# Patient Record
Sex: FEMALE | Race: WHITE | HISPANIC OR LATINO
[De-identification: names, ages, dates, MRNs, and addresses within clinical notes are randomized per-mention and may not be internally consistent; named-entity substitution may affect disease eponyms.]

---

## 2020-04-06 ENCOUNTER — TRANSCRIPTION ENCOUNTER (OUTPATIENT)
Age: 40
End: 2020-04-06

## 2020-04-22 ENCOUNTER — TRANSCRIPTION ENCOUNTER (OUTPATIENT)
Age: 40
End: 2020-04-22

## 2021-03-25 ENCOUNTER — TRANSCRIPTION ENCOUNTER (OUTPATIENT)
Age: 41
End: 2021-03-25

## 2021-04-06 ENCOUNTER — APPOINTMENT (OUTPATIENT)
Dept: DISASTER EMERGENCY | Facility: OTHER | Age: 41
End: 2021-04-06
Payer: COMMERCIAL

## 2021-04-06 PROCEDURE — 0001A: CPT

## 2021-04-27 ENCOUNTER — APPOINTMENT (OUTPATIENT)
Dept: DISASTER EMERGENCY | Facility: OTHER | Age: 41
End: 2021-04-27
Payer: COMMERCIAL

## 2021-04-27 PROCEDURE — 0002A: CPT

## 2021-06-24 PROBLEM — Z00.00 ENCOUNTER FOR PREVENTIVE HEALTH EXAMINATION: Status: ACTIVE | Noted: 2021-06-24

## 2021-06-30 ENCOUNTER — APPOINTMENT (OUTPATIENT)
Dept: ORTHOPEDIC SURGERY | Facility: CLINIC | Age: 41
End: 2021-06-30
Payer: COMMERCIAL

## 2021-06-30 VITALS — HEIGHT: 59 IN | DIASTOLIC BLOOD PRESSURE: 117 MMHG | HEART RATE: 112 BPM | SYSTOLIC BLOOD PRESSURE: 166 MMHG

## 2021-06-30 DIAGNOSIS — Z78.9 OTHER SPECIFIED HEALTH STATUS: ICD-10-CM

## 2021-06-30 DIAGNOSIS — Z84.89 FAMILY HISTORY OF OTHER SPECIFIED CONDITIONS: ICD-10-CM

## 2021-06-30 DIAGNOSIS — Z80.9 FAMILY HISTORY OF MALIGNANT NEOPLASM, UNSPECIFIED: ICD-10-CM

## 2021-06-30 DIAGNOSIS — M25.561 PAIN IN RIGHT KNEE: ICD-10-CM

## 2021-06-30 DIAGNOSIS — M25.562 PAIN IN RIGHT KNEE: ICD-10-CM

## 2021-06-30 DIAGNOSIS — F17.200 NICOTINE DEPENDENCE, UNSPECIFIED, UNCOMPLICATED: ICD-10-CM

## 2021-06-30 DIAGNOSIS — Z82.62 FAMILY HISTORY OF OSTEOPOROSIS: ICD-10-CM

## 2021-06-30 PROCEDURE — 99203 OFFICE O/P NEW LOW 30 MIN: CPT

## 2021-06-30 PROCEDURE — 99072 ADDL SUPL MATRL&STAF TM PHE: CPT

## 2021-06-30 NOTE — PHYSICAL EXAM
[de-identified] : General Exam\par \par Well developed, well nourished\par No apparent distress\par Oriented to person, place, and time\par Mood: Normal\par Affect: Normal\par Balance and coordination: Normal\par Gait: Normal\par \par right knee exam\par \par Skin: Clean, dry, intact\par Inspection: No obvious malalignment, no masses, no swelling, no effusion.\par Tenderness: no MJLT. No LJLT. No tenderness over the medial and lateral patella facets. No ttp medial/lateral epicondyle, patella tendon, tibial tubercle, pes anserinus, or proximal fibula.\par ROM: 0 to 130° no pain with deep flexion in both knees\par Stability: Stable to varus, valgus, lachman testing. Negative anterior/posterior drawer.\par Additional tests: Negative McMurrays test, Negative patellar grind test. \par Strength: 5-/5 Q/H/TA/GS/EHL, no atrophy\par Neuro: In tact to light touch throughout in dp/sp/tib/araceli/saph nerve districutions, DTR's normal\par Pulses: 2+ DP/PT pulses.\par \par rleft knee exam\par \par Skin: Clean, dry, intact\par Inspection: No obvious malalignment, no masses, no swelling, no effusion.\par Tenderness: no MJLT. No LJLT. No tenderness over the medial and lateral patella facets. No ttp medial/lateral epicondyle, patella tendon, tibial tubercle, pes anserinus, or proximal fibula.\par ROM: 0 to 130° no pain with deep flexion in both knees\par Stability: Stable to varus, valgus, lachman testing. Negative anterior/posterior drawer.\par Additional tests: Negative McMurrays test, Negative patellar grind test. \par Strength: 5-/5 Q/H/TA/GS/EHL, no atrophy\par Neuro: In tact to light touch throughout in dp/sp/tib/araceli/saph nerve districutions, DTR's normal\par Pulses: 2+ DP/PT pulses.\par  [de-identified] : mRI reports and images of both knees were reviewed. One is unremarkable. The other knee there is a small nondisplaced medial meniscus tear and a small ganglion cyst

## 2021-06-30 NOTE — HISTORY OF PRESENT ILLNESS
[de-identified] : 40-year-old female with a chief complaint of bilateral leg pain numbness and weakness. She reports her symptoms started having gallbladder surgery. She is in multiple specialists including neurology rheumatology and orthopedics.She had multiple studies done by multiple providers. She presents today with several MRIs of her knee is in no records of any rather doctor visits.\par \par The patient's past medical history, past surgical history, medications, allergies, and social history were reviewed by me today with the patient and documented accordingly. In addition, the patient's family history, which is noncontributory to this visit, was also reviewed.\par

## 2021-06-30 NOTE — DISCUSSION/SUMMARY
[de-identified] : 40-year-old female complains of bilateral leg pain numbness tingling and weakness. She significant quadriceps and core weakness consistent with patellofemoral pain I recommended physical therapy to address this. We discussed at length that the small ganglion cyst and meniscal tear are unlikely to explain her symptoms in both legs with pain and weakness radiating up and down both of her legs. She will provide us with the records of her neurology rheumatology consultations. I do not feel this is orthopedic in nature at least in terms of her knees are unlikely the culprit. Recommend physical therapy. She may follow up as needed

## 2021-08-26 ENCOUNTER — TRANSCRIPTION ENCOUNTER (OUTPATIENT)
Age: 41
End: 2021-08-26

## 2021-11-03 ENCOUNTER — APPOINTMENT (OUTPATIENT)
Dept: ENDOCRINOLOGY | Facility: CLINIC | Age: 41
End: 2021-11-03

## 2022-01-31 ENCOUNTER — APPOINTMENT (OUTPATIENT)
Dept: SURGERY | Facility: CLINIC | Age: 42
End: 2022-01-31
Payer: COMMERCIAL

## 2022-01-31 VITALS
TEMPERATURE: 97.6 F | DIASTOLIC BLOOD PRESSURE: 91 MMHG | WEIGHT: 130 LBS | SYSTOLIC BLOOD PRESSURE: 135 MMHG | RESPIRATION RATE: 17 BRPM | BODY MASS INDEX: 26.21 KG/M2 | HEIGHT: 59 IN | HEART RATE: 95 BPM | OXYGEN SATURATION: 98 %

## 2022-01-31 DIAGNOSIS — Z87.19 PERSONAL HISTORY OF OTHER DISEASES OF THE DIGESTIVE SYSTEM: ICD-10-CM

## 2022-01-31 DIAGNOSIS — Z86.79 PERSONAL HISTORY OF OTHER DISEASES OF THE CIRCULATORY SYSTEM: ICD-10-CM

## 2022-01-31 DIAGNOSIS — K64.8 PERIANAL VENOUS THROMBOSIS: ICD-10-CM

## 2022-01-31 DIAGNOSIS — K64.5 PERIANAL VENOUS THROMBOSIS: ICD-10-CM

## 2022-01-31 DIAGNOSIS — Z83.79 FAMILY HISTORY OF OTHER DISEASES OF THE DIGESTIVE SYSTEM: ICD-10-CM

## 2022-01-31 PROCEDURE — 99244 OFF/OP CNSLTJ NEW/EST MOD 40: CPT | Mod: 25

## 2022-01-31 PROCEDURE — 46600 DIAGNOSTIC ANOSCOPY SPX: CPT

## 2022-01-31 RX ORDER — POLYETHYLENE GLYCOL-3350 AND ELECTROLYTES 236; 6.74; 5.86; 2.97; 22.74 G/274.31G; G/274.31G; G/274.31G; G/274.31G; G/274.31G
236 POWDER, FOR SOLUTION ORAL
Qty: 4000 | Refills: 0 | Status: DISCONTINUED | COMMUNITY
Start: 2022-01-25 | End: 2022-01-31

## 2022-01-31 RX ORDER — MELOXICAM 15 MG/1
15 TABLET ORAL
Qty: 30 | Refills: 0 | Status: ACTIVE | COMMUNITY
Start: 2022-01-17

## 2022-01-31 RX ORDER — HYDROCORTISONE 25 MG/G
2.5 CREAM TOPICAL
Qty: 30 | Refills: 0 | Status: ACTIVE | COMMUNITY
Start: 2022-01-24

## 2022-01-31 RX ORDER — FAMOTIDINE 20 MG/1
20 TABLET, FILM COATED ORAL
Qty: 14 | Refills: 0 | Status: ACTIVE | COMMUNITY
Start: 2021-11-19

## 2022-01-31 RX ORDER — LACTULOSE 10 G/15ML
10 SOLUTION ORAL
Qty: 473 | Refills: 0 | Status: DISCONTINUED | COMMUNITY
Start: 2022-01-25

## 2022-01-31 RX ORDER — ACETAMINOPHEN EXTRA STRENGTH 500 MG/1
500 TABLET ORAL
Qty: 90 | Refills: 0 | Status: ACTIVE | COMMUNITY
Start: 2021-11-29

## 2022-01-31 RX ORDER — LOSARTAN POTASSIUM 25 MG/1
25 TABLET, FILM COATED ORAL
Qty: 90 | Refills: 0 | Status: ACTIVE | COMMUNITY
Start: 2022-01-07

## 2022-01-31 NOTE — PHYSICAL EXAM
[FreeTextEntry1] : This is a 41 year-old well-developed female in no apparent distress.\par \par HEENT normocephalic, anicteric, external ears normal bilaterally, EOMs intact.\par \par Cardiac - regular rate and rhythm.\par \par Abdomen soft, nontender, nondistended, no masses. No hepatosplenomegaly.\par \par Examination of the perineum reveals right anterolateral and right posterior lateral external hemorrhoids and a left lateral external hemorrhoid with a prolapsing thrombosed internal component. \par Digital rectal examination reveals normal sphincter tone. \par Anoscopy reveals moderately enlarged internal hemorrhoids with thrombosed vessels along the left lateral position.\par \par Neuro-cranial nerves grossly intact. Normal gait.\par \par Psychiatric-oriented to time place and person. Good understanding of conversation.

## 2022-01-31 NOTE — HISTORY OF PRESENT ILLNESS
[FreeTextEntry1] : Shakila is a 40 y/o female here for anorectal pain.  The patient reports longstanding excess perianal tissue after childbirth 21 years ago.  Reports longstanding history of constipation and that after her laparoscopic cholecystectomy in the summer 2020 things took a turn for the worse in terms of her GI function.  She had an ER visit at [facility] the second week of January for what appears to be deep pelvic pain and the ED told her it was due to fibroids.  She saw her GYN doctor GYN then referred her back to the GI doctor.  Reports she tried lactulose.  Currently she is on Linzess and Trulance.  States that these medications help some.  She reports straining and constipation.  States that after she had Covid in December then in January she started experiencing anorectal pain.  In addition to the baseline excess perianal tissue she reports intermittent perianal swelling several times during the month. Starting 1/12/22, she had episodes of BRBPR with BMs. Last saw blood a week ago. \par The patient reports feeling frustrated because no one can give her pain relief.

## 2022-01-31 NOTE — ASSESSMENT
[FreeTextEntry1] : 41-year-old female with external and internal thrombosed and prolapsing hemorrhoids in the background of constipation and straining.  I discussed that the thrombosis will resolve on its own.  In order to treat her hemorrhoid disease she would need an excisional hemorrhoidectomy which unfortunately has a painful recovery.  Her hemorrhoids would likely return after hemorrhoidectomy due to her constipation and straining. All above make her a very poor surgical candidate and I would not recommend a hemorrhoidectomy for her.  The patient was asking whether I can simply tie the vein that feeds the hemorrhoid.  I explained my recommendations as well as I could and answered all her questions.\par RTO as needed.

## 2022-07-19 ENCOUNTER — NON-APPOINTMENT (OUTPATIENT)
Age: 42
End: 2022-07-19

## 2022-07-19 ENCOUNTER — APPOINTMENT (OUTPATIENT)
Dept: GASTROENTEROLOGY | Facility: CLINIC | Age: 42
End: 2022-07-19

## 2022-07-19 DIAGNOSIS — K59.09 OTHER CONSTIPATION: ICD-10-CM

## 2022-07-19 DIAGNOSIS — Z01.812 ENCOUNTER FOR PREPROCEDURAL LABORATORY EXAMINATION: ICD-10-CM

## 2022-07-19 PROCEDURE — 99441: CPT

## 2022-08-30 LAB — SARS-COV-2 N GENE NPH QL NAA+PROBE: NOT DETECTED

## 2022-09-02 ENCOUNTER — TRANSCRIPTION ENCOUNTER (OUTPATIENT)
Age: 42
End: 2022-09-02

## 2022-09-02 ENCOUNTER — OUTPATIENT (OUTPATIENT)
Dept: OUTPATIENT SERVICES | Facility: HOSPITAL | Age: 42
LOS: 1 days | End: 2022-09-02
Payer: COMMERCIAL

## 2022-09-02 ENCOUNTER — APPOINTMENT (OUTPATIENT)
Dept: GASTROENTEROLOGY | Facility: HOSPITAL | Age: 42
End: 2022-09-02

## 2022-09-02 VITALS
RESPIRATION RATE: 16 BRPM | OXYGEN SATURATION: 99 % | HEIGHT: 59 IN | SYSTOLIC BLOOD PRESSURE: 138 MMHG | DIASTOLIC BLOOD PRESSURE: 87 MMHG | TEMPERATURE: 98 F | HEART RATE: 73 BPM | WEIGHT: 119.93 LBS

## 2022-09-02 DIAGNOSIS — K59.09 OTHER CONSTIPATION: ICD-10-CM

## 2022-09-02 DIAGNOSIS — Z98.51 TUBAL LIGATION STATUS: Chronic | ICD-10-CM

## 2022-09-02 DIAGNOSIS — Z98.891 HISTORY OF UTERINE SCAR FROM PREVIOUS SURGERY: Chronic | ICD-10-CM

## 2022-09-02 DIAGNOSIS — Z98.890 OTHER SPECIFIED POSTPROCEDURAL STATES: Chronic | ICD-10-CM

## 2022-09-02 DIAGNOSIS — Z90.49 ACQUIRED ABSENCE OF OTHER SPECIFIED PARTS OF DIGESTIVE TRACT: Chronic | ICD-10-CM

## 2022-09-02 PROCEDURE — 91122 ANORECTAL MANOMETRY: CPT | Mod: 26

## 2022-09-02 PROCEDURE — 91120: CPT | Mod: 26

## 2022-09-02 PROCEDURE — 91122: CPT

## 2022-09-02 DEVICE — CATH THERMODIL PACE 7.5FR: Type: IMPLANTABLE DEVICE | Status: FUNCTIONAL

## 2022-09-02 DEVICE — KIT A-LINE 1LUM 20G X 12CM SAFE KIT: Type: IMPLANTABLE DEVICE | Status: FUNCTIONAL

## 2022-09-02 DEVICE — KIT CVC 2LUM MAC 9FR CHG: Type: IMPLANTABLE DEVICE | Status: FUNCTIONAL

## 2022-09-02 DEVICE — NET RETRV ROT ROTH 2.5MMX230CM: Type: IMPLANTABLE DEVICE | Status: FUNCTIONAL

## 2022-09-02 RX ORDER — LUBIPROSTONE 24 UG/1
1 CAPSULE, GELATIN COATED ORAL
Qty: 0 | Refills: 0 | DISCHARGE

## 2022-09-02 RX ORDER — LACTULOSE 10 G/15ML
15 SOLUTION ORAL
Qty: 0 | Refills: 0 | DISCHARGE

## 2022-09-02 NOTE — ASU PATIENT PROFILE, ADULT - NSICDXPASTSURGICALHX_GEN_ALL_CORE_FT
PAST SURGICAL HISTORY:  H/O hemorrhoidectomy     H/O tubal ligation     S/P      S/P cholecystectomy

## 2022-09-02 NOTE — PRE PROCEDURE NOTE - PRE PROCEDURE EVALUATION
Attending Physician:              Jagdish Jarrett MD MSEd  Performing Provider:            LEOLA Bunch_ C   Indication for Procedure;       Chronic constipation                PAST MEDICAL & SURGICAL HISTORY:  No pertinent past medical history      H/O tubal ligation      S/P cholecystectomy      S/P       H/O hemorrhoidectomy            See Allscripts Note for further details  ALLERGIES:  No Known Allergies    HOME MEDICATIONS:  lactulose 10 g/15 mL oral syrup: 15 milliliter(s) orally once a day  lubiprostone 8 mcg oral capsule: 1 cap(s) orally 2 times a day      See Allscripts Note for further details    AICD/PPM: [ ] yes   [ x] no    PERTINENT LAB DATA:                      PHYSICAL EXAMINATION:    Height (cm): 149.9  Weight (kg): 54.4  BMI (kg/m2): 24.2  BSA (m2): 1.48T(C): 36.4  HR: 73  BP: 138/87  RR: 16  SpO2: 99%    Constitutional: NAD  HEENT: PERRLA, EOMI,    Neck:  No JVD  Respiratory: CTAB/L  Cardiovascular: S1 and S2  Gastrointestinal: BS+, soft, NT/ND  Extremities: No peripheral edema  Neurological: A/O x 3, no focal deficits  Psychiatric: Normal mood, normal affect  Skin: No rashes    ASA Class: I [ ]  II [ x]  III [ ]  IV [ ]    COMMENTS:    The patient is a suitable candidate for the planned procedure unless box checked [ ]  No, explain:

## 2022-09-02 NOTE — ASU PATIENT PROFILE, ADULT - FALL HARM RISK - UNIVERSAL INTERVENTIONS
Bed in lowest position, wheels locked, appropriate side rails in place/Call bell, personal items and telephone in reach/Instruct patient to call for assistance before getting out of bed or chair/Non-slip footwear when patient is out of bed/[place] to call system/Physically safe environment - no spills, clutter or unnecessary equipment/Purposeful Proactive Rounding/Room/bathroom lighting operational, light cord in reach

## 2022-09-02 NOTE — ASU DISCHARGE PLAN (ADULT/PEDIATRIC) - DATE OF LAST VACCINATION
Ventricular Rate : 53  Atrial Rate : 53  P-R Interval : 202  QRS Duration : 88  Q-T Interval : 454  QTC Calculation(Bezet) : 426  P Axis : 34  R Axis : -2  T Axis : 37  Diagnosis : Sinus bradycardia  Minimal voltage criteria for LVH, may be normal variant  Possible Inferior infarct , age undetermined  Cannot rule out Anterior infarct (cited on or before 06-MAY-2019)  Abnormal ECG  When compared with ECG of 06-MAY-2019 18:06,  Questionable change in initial forces of Anterior leads  Confirmed by Lowell Webber ([id number]) on 5/17/2019 6:08:21 PM   27-Apr-2021

## 2022-09-23 ENCOUNTER — TRANSCRIPTION ENCOUNTER (OUTPATIENT)
Age: 42
End: 2022-09-23

## 2022-09-26 ENCOUNTER — TRANSCRIPTION ENCOUNTER (OUTPATIENT)
Age: 42
End: 2022-09-26

## 2022-10-05 ENCOUNTER — TRANSCRIPTION ENCOUNTER (OUTPATIENT)
Age: 42
End: 2022-10-05

## 2022-10-06 PROBLEM — Z78.9 OTHER SPECIFIED HEALTH STATUS: Chronic | Status: ACTIVE | Noted: 2022-09-02

## 2022-10-07 ENCOUNTER — TRANSCRIPTION ENCOUNTER (OUTPATIENT)
Age: 42
End: 2022-10-07

## 2022-11-02 ENCOUNTER — APPOINTMENT (OUTPATIENT)
Dept: SURGERY | Facility: CLINIC | Age: 42
End: 2022-11-02

## 2022-11-02 VITALS
RESPIRATION RATE: 18 BRPM | OXYGEN SATURATION: 99 % | TEMPERATURE: 97.8 F | HEART RATE: 86 BPM | SYSTOLIC BLOOD PRESSURE: 134 MMHG | WEIGHT: 120 LBS | BODY MASS INDEX: 24.19 KG/M2 | HEIGHT: 59 IN | DIASTOLIC BLOOD PRESSURE: 90 MMHG

## 2022-11-02 DIAGNOSIS — M62.89 OTHER SPECIFIED DISORDERS OF MUSCLE: ICD-10-CM

## 2022-11-02 DIAGNOSIS — K59.4 ANAL SPASM: ICD-10-CM

## 2022-11-02 PROCEDURE — 99213 OFFICE O/P EST LOW 20 MIN: CPT | Mod: 25

## 2022-11-02 PROCEDURE — 46600 DIAGNOSTIC ANOSCOPY SPX: CPT

## 2022-11-02 RX ORDER — LUBIPROSTONE 24 UG/1
24 CAPSULE ORAL
Qty: 60 | Refills: 0 | Status: ACTIVE | COMMUNITY
Start: 2022-10-11

## 2022-11-02 RX ORDER — ONDANSETRON 4 MG/1
4 TABLET ORAL
Qty: 15 | Refills: 0 | Status: DISCONTINUED | COMMUNITY
Start: 2021-11-19 | End: 2022-11-02

## 2022-11-02 RX ORDER — HYDROCHLOROTHIAZIDE 12.5 MG/1
12.5 CAPSULE ORAL
Qty: 90 | Refills: 0 | Status: DISCONTINUED | COMMUNITY
Start: 2021-11-19 | End: 2022-11-02

## 2022-11-02 RX ORDER — LEVONORGESTREL AND ETHINYL ESTRADIOL 100-20(84)
0.1-0.02 & 0.01 KIT ORAL
Qty: 91 | Refills: 0 | Status: DISCONTINUED | COMMUNITY
Start: 2022-01-14 | End: 2022-11-02

## 2022-11-02 RX ORDER — PROPRANOLOL HYDROCHLORIDE 10 MG/1
10 TABLET ORAL
Qty: 180 | Refills: 0 | Status: DISCONTINUED | COMMUNITY
Start: 2021-12-03 | End: 2022-11-02

## 2022-11-02 RX ORDER — DICYCLOMINE HYDROCHLORIDE 20 MG/1
20 TABLET ORAL
Qty: 60 | Refills: 0 | Status: DISCONTINUED | COMMUNITY
Start: 2022-01-17 | End: 2022-11-02

## 2022-11-02 NOTE — ASSESSMENT
[FreeTextEntry1] : I have seen and evaluated patient and I have corroborated all nursing input into this note.  Patient was able to easily evacuate stool up until she had her first child.  She was able to sit on the toilet and evacuate without significant Valsalva.  Her difficulty with sphincter spasm began at age 19.  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  Therefore, there is no indication for circular muscle/internal sphincter biopsy to rule out Hirschsprung's.  Patient should proceed with pelvic floor physical therapy.  If the therapist is of the opinion that internal sphincter Botox injections will augment the treatment then the patient will contact my office to make arrangements for the procedure.
Statement Selected

## 2022-11-02 NOTE — PHYSICAL EXAM
[Normal Breath Sounds] : Normal breath sounds [Normal Heart Sounds] : normal heart sounds [No Rash or Lesion] : No rash or lesion [Alert] : alert [Oriented to Person] : oriented to person [Oriented to Place] : oriented to place [Oriented to Time] : oriented to time [Calm] : calm [JVD] : no jugular venous distention  [de-identified] : Well developed female [de-identified] : Normal [de-identified] : Normal [de-identified] : Normal [FreeTextEntry1] : Perianal inspection demonstrated external tags and scarring in the right posterior position consistent with a prior hemorrhoidectomy.  Internal sphincter spasm was noted on digital exam.  Anoscopy mild internal hemorrhoid enlargement.\par \par \par Anoscopy performed to evaluate internal hemorrhoids.

## 2022-11-02 NOTE — HISTORY OF PRESENT ILLNESS
[FreeTextEntry1] : Yanira is a 42 years old female here for a consultation. \par \par Colonoscopy 2/9/22 - Prolapsed external hemorrhoids otherwise normal colonoscopy.  Status post biopsies taken to rule out microscopic colitis.  \par \par Anorectal motility study 9/2/22-rectoanal inhibitory reflex present.  Blunted relaxation noted.\par \par Patient had hemorrhoidectomy earlier this year.  She moves her bowels without difficulty up until her first child.  Prior to her first child she would sit on the toilet and evacuate without having to Valsalva significantly.  More recently her stool has become harder and she has had increasing difficulty with bowel movements.\par \par Today patient reports feeling well. Patient reports chronic constipation up to after her son was born, got worse after cholecystectomy in  2020. Reports thin small BM in varying frequency ( 1-5 days) BRB bleeding with constipated BM on toilet paper and toilet bowel. Reports pain with BM. Reports anal itching and prolapsing tissues shrinks and swollen. Appetite is poor, no nausea/vomiting. Taking Lubiprostone  for IBS and constipation. Patient is not on any anticoagulants.

## 2022-11-02 NOTE — CONSULT LETTER
[Dear  ___] : Dear ~GARRETT, [Courtesy Letter:] : I had the pleasure of seeing your patient, [unfilled], in my office today. [Please see my note below.] : Please see my note below. [Consult Closing:] : Thank you very much for allowing me to participate in the care of this patient.  If you have any questions, please do not hesitate to contact me. [Sincerely,] : Sincerely, [FreeTextEntry2] : Dr. Christopher Gavin [FreeTextEntry3] : Wily Dominguez M.D., SHIVAM.BINH., F.THOMAS.S.NINAREditaS.\[facility] Chief Colorectal Clinical Services, [facility]

## 2022-11-07 ENCOUNTER — NON-APPOINTMENT (OUTPATIENT)
Age: 42
End: 2022-11-07

## 2023-05-21 ENCOUNTER — EMERGENCY (EMERGENCY)
Facility: HOSPITAL | Age: 43
LOS: 1 days | Discharge: HOME | End: 2023-05-21
Attending: STUDENT IN AN ORGANIZED HEALTH CARE EDUCATION/TRAINING PROGRAM | Admitting: STUDENT IN AN ORGANIZED HEALTH CARE EDUCATION/TRAINING PROGRAM
Payer: COMMERCIAL

## 2023-05-21 VITALS
RESPIRATION RATE: 16 BRPM | SYSTOLIC BLOOD PRESSURE: 116 MMHG | DIASTOLIC BLOOD PRESSURE: 81 MMHG | TEMPERATURE: 98 F | HEART RATE: 88 BPM | OXYGEN SATURATION: 100 %

## 2023-05-21 VITALS
TEMPERATURE: 98 F | DIASTOLIC BLOOD PRESSURE: 86 MMHG | RESPIRATION RATE: 17 BRPM | HEART RATE: 65 BPM | OXYGEN SATURATION: 100 % | SYSTOLIC BLOOD PRESSURE: 118 MMHG

## 2023-05-21 DIAGNOSIS — Z90.49 ACQUIRED ABSENCE OF OTHER SPECIFIED PARTS OF DIGESTIVE TRACT: Chronic | ICD-10-CM

## 2023-05-21 DIAGNOSIS — Z98.890 OTHER SPECIFIED POSTPROCEDURAL STATES: Chronic | ICD-10-CM

## 2023-05-21 DIAGNOSIS — Z98.51 TUBAL LIGATION STATUS: Chronic | ICD-10-CM

## 2023-05-21 DIAGNOSIS — Z98.891 HISTORY OF UTERINE SCAR FROM PREVIOUS SURGERY: Chronic | ICD-10-CM

## 2023-05-21 LAB
ALBUMIN SERPL ELPH-MCNC: 4.2 G/DL — SIGNIFICANT CHANGE UP (ref 3.3–5)
ALP SERPL-CCNC: 72 U/L — SIGNIFICANT CHANGE UP (ref 40–120)
ALT FLD-CCNC: 9 U/L — SIGNIFICANT CHANGE UP (ref 4–33)
ANION GAP SERPL CALC-SCNC: 12 MMOL/L — SIGNIFICANT CHANGE UP (ref 7–14)
AST SERPL-CCNC: 22 U/L — SIGNIFICANT CHANGE UP (ref 4–32)
BASOPHILS # BLD AUTO: 0.03 K/UL — SIGNIFICANT CHANGE UP (ref 0–0.2)
BASOPHILS NFR BLD AUTO: 0.6 % — SIGNIFICANT CHANGE UP (ref 0–2)
BILIRUB SERPL-MCNC: 0.2 MG/DL — SIGNIFICANT CHANGE UP (ref 0.2–1.2)
BUN SERPL-MCNC: 8 MG/DL — SIGNIFICANT CHANGE UP (ref 7–23)
CALCIUM SERPL-MCNC: 8.6 MG/DL — SIGNIFICANT CHANGE UP (ref 8.4–10.5)
CHLORIDE SERPL-SCNC: 105 MMOL/L — SIGNIFICANT CHANGE UP (ref 98–107)
CO2 SERPL-SCNC: 21 MMOL/L — LOW (ref 22–31)
CREAT SERPL-MCNC: 0.65 MG/DL — SIGNIFICANT CHANGE UP (ref 0.5–1.3)
EGFR: 111 ML/MIN/1.73M2 — SIGNIFICANT CHANGE UP
EOSINOPHIL # BLD AUTO: 0.11 K/UL — SIGNIFICANT CHANGE UP (ref 0–0.5)
EOSINOPHIL NFR BLD AUTO: 2.4 % — SIGNIFICANT CHANGE UP (ref 0–6)
GLUCOSE SERPL-MCNC: 89 MG/DL — SIGNIFICANT CHANGE UP (ref 70–99)
HCG SERPL-ACNC: <1 MIU/ML — SIGNIFICANT CHANGE UP
HCT VFR BLD CALC: 36.8 % — SIGNIFICANT CHANGE UP (ref 34.5–45)
HGB BLD-MCNC: 12 G/DL — SIGNIFICANT CHANGE UP (ref 11.5–15.5)
IANC: 2.77 K/UL — SIGNIFICANT CHANGE UP (ref 1.8–7.4)
IMM GRANULOCYTES NFR BLD AUTO: 0.2 % — SIGNIFICANT CHANGE UP (ref 0–0.9)
LYMPHOCYTES # BLD AUTO: 1.45 K/UL — SIGNIFICANT CHANGE UP (ref 1–3.3)
LYMPHOCYTES # BLD AUTO: 31.1 % — SIGNIFICANT CHANGE UP (ref 13–44)
MCHC RBC-ENTMCNC: 28.7 PG — SIGNIFICANT CHANGE UP (ref 27–34)
MCHC RBC-ENTMCNC: 32.6 GM/DL — SIGNIFICANT CHANGE UP (ref 32–36)
MCV RBC AUTO: 88 FL — SIGNIFICANT CHANGE UP (ref 80–100)
MONOCYTES # BLD AUTO: 0.29 K/UL — SIGNIFICANT CHANGE UP (ref 0–0.9)
MONOCYTES NFR BLD AUTO: 6.2 % — SIGNIFICANT CHANGE UP (ref 2–14)
NEUTROPHILS # BLD AUTO: 2.77 K/UL — SIGNIFICANT CHANGE UP (ref 1.8–7.4)
NEUTROPHILS NFR BLD AUTO: 59.5 % — SIGNIFICANT CHANGE UP (ref 43–77)
NRBC # BLD: 0 /100 WBCS — SIGNIFICANT CHANGE UP (ref 0–0)
NRBC # FLD: 0 K/UL — SIGNIFICANT CHANGE UP (ref 0–0)
PLATELET # BLD AUTO: 259 K/UL — SIGNIFICANT CHANGE UP (ref 150–400)
POTASSIUM SERPL-MCNC: 4 MMOL/L — SIGNIFICANT CHANGE UP (ref 3.5–5.3)
POTASSIUM SERPL-SCNC: 4 MMOL/L — SIGNIFICANT CHANGE UP (ref 3.5–5.3)
PROT SERPL-MCNC: 6.4 G/DL — SIGNIFICANT CHANGE UP (ref 6–8.3)
RBC # BLD: 4.18 M/UL — SIGNIFICANT CHANGE UP (ref 3.8–5.2)
RBC # FLD: 12.8 % — SIGNIFICANT CHANGE UP (ref 10.3–14.5)
SODIUM SERPL-SCNC: 138 MMOL/L — SIGNIFICANT CHANGE UP (ref 135–145)
WBC # BLD: 4.66 K/UL — SIGNIFICANT CHANGE UP (ref 3.8–10.5)
WBC # FLD AUTO: 4.66 K/UL — SIGNIFICANT CHANGE UP (ref 3.8–10.5)

## 2023-05-21 PROCEDURE — 99285 EMERGENCY DEPT VISIT HI MDM: CPT

## 2023-05-21 PROCEDURE — 74177 CT ABD & PELVIS W/CONTRAST: CPT | Mod: 26,MA

## 2023-05-21 RX ORDER — HYDROCORTISONE 1 %
1 OINTMENT (GRAM) TOPICAL
Qty: 1 | Refills: 0
Start: 2023-05-21 | End: 2023-05-25

## 2023-05-21 RX ORDER — KETOROLAC TROMETHAMINE 30 MG/ML
15 SYRINGE (ML) INJECTION ONCE
Refills: 0 | Status: DISCONTINUED | OUTPATIENT
Start: 2023-05-21 | End: 2023-05-21

## 2023-05-21 RX ORDER — ACETAMINOPHEN 500 MG
1000 TABLET ORAL ONCE
Refills: 0 | Status: COMPLETED | OUTPATIENT
Start: 2023-05-21 | End: 2023-05-21

## 2023-05-21 RX ADMIN — Medication 400 MILLIGRAM(S): at 09:48

## 2023-05-21 RX ADMIN — Medication 15 MILLIGRAM(S): at 12:56

## 2023-05-21 RX ADMIN — Medication 15 MILLIGRAM(S): at 13:30

## 2023-05-21 NOTE — ED PROVIDER NOTE - PROGRESS NOTE DETAILS
CT with no acute findings, slight rectal inflammation, pt feels slightly improved with toradol but cant take nsaids due to gastritis, cant take narcotics due to constipation  will send anusol  for local pain control pt expresses frustration that she has seen at least 14 doctors for the same problem and has gotten the same advice, was seeking different recs at this time and states the anusol is too expensive and her insurance doesn't cover it. recommended that she buy it over the counter for low cost. pt states shes tried it before. recommended that pt follow up with GI specialist if this is not helping

## 2023-05-21 NOTE — ED PROVIDER NOTE - PATIENT PORTAL LINK FT
You can access the FollowMyHealth Patient Portal offered by [facility] by registering at the following website: [email or web address]. By joining [employer]’s FollowMyHealth portal, you will also be able to view your health information using other applications (apps) compatible with our system.

## 2023-05-21 NOTE — ED PROVIDER NOTE - CLINICAL SUMMARY MEDICAL DECISION MAKING FREE TEXT BOX
42-year-old female with a history of IBS, who has chronic abdominal pain and rectal pain presents with worsening rectal pain.  Patient states she had hemorrhoid removal about 1 year ago but continues to have pain.  Patient states she had a TeleDoc visit and she sent pictures to tele doctor and was told to go to the ER to eval for perirectal abscess as pt stated she felt like something was protruding from her rectum.  She is not having any fevers but a couple nights ago she woke up drenched in sweat.  Patient is chronically constipated and sometimes has to self disimpact.  Has history of gallbladder removal.  On exam patient is well-appearing vitals within normal limits rectal exam with no palpable abscess.  Given patient's persistent pain will get CAT scan for eval for abscess.  Will recommend treatment for hemorrhoids.

## 2023-05-21 NOTE — ED PROVIDER NOTE - PHYSICAL EXAMINATION
Gen: Well appearing in NAD  Head: NC/AT  Neck: trachea midline  Resp:  No distress  abd nontender  rectal exam with no swelling, induration, no palpable abscess   Ext: no deformities  Neuro:  A&O appears non focal  Skin:  Warm and dry as visualized  Psych:  Normal affect and mood

## 2023-05-21 NOTE — ED PROVIDER NOTE - NSFOLLOWUPINSTRUCTIONS_ED_ALL_ED_FT
You were seen for rectal pain, CT with no abscess or acute infection.     1. TAKE ALL MEDICATIONS AS DIRECTED.    2. FOR PAIN OR FEVER YOU CAN TAKE IBUPROFEN (MOTRIN, ADVIL) OR ACETAMINOPHEN (TYLENOL) AS NEEDED, AS DIRECTED ON PACKAGING.  3. FOLLOW UP WITH YOUR PRIMARY DOCTOR WITHIN 5 DAYS AS DIRECTED.  4. IF YOU HAD LABS OR IMAGING DONE, YOU WERE GIVEN COPIES OF ALL LABS AND/OR IMAGING RESULTS FROM YOUR ER VISIT--PLEASE TAKE THEM WITH YOU TO YOUR FOLLOW UP APPOINTMENTS.  5. IF NEEDED, CALL PATIENT ACCESS SERVICES AT [phone] TO FIND A PRIMARY CARE PHYSICIAN.  OR CALL [phone] TO MAKE AN APPOINTMENT WITH THE CLINIC.  6. RETURN TO THE ER FOR ANY WORSENING SYMPTOMS OR CONCERNS.    Use anusol suppositories as directed. Try sitz bath for rectal pain. USe stool softeners to soften stool and decrease straining  Follow up with your GI    RECTAL PAIN - AfterCare(R) Instructions(ER/ED)    Rectal Pain    WHAT YOU NEED TO KNOW:    Rectal pain can be caused by a number of conditions, such as hemorrhoids, an abscess, trauma, or anal tear. Infection, muscle spasms, or anal intercourse can also cause rectal pain.    DISCHARGE INSTRUCTIONS:    Medicines: You may need any of the following:    NSAIDs, such as ibuprofen, help decrease swelling, pain, and fever. This medicine is available with or without a doctor's order. NSAIDs can cause stomach bleeding or kidney problems in certain people. If you take blood thinner medicine, always ask your healthcare provider if NSAIDs are safe for you. Always read the medicine label and follow directions.    Prescription pain medicine may be given. Ask how to take this medicine safely.    Antibiotics help treat or prevent a bacterial infection.    Bowel movement softeners help soften your bowel movement. They help prevent straining and more damage to the area.    Take your medicine as directed. Contact your healthcare provider if you think your medicine is not helping or if you have side effects. Tell your provider if you are allergic to any medicine. Keep a list of the medicines, vitamins, and herbs you take. Include the amounts, and when and why you take them. Bring the list or the pill bottles to follow-up visits. Carry your medicine list with you in case of an emergency.  Return to the emergency department if:    You have severe pain.    Contact your healthcare provider if:    Your pain does not decrease after 1 to 2 days of treatment.    You cannot take the medicine prescribed for your condition.    You have questions or concerns about your condition or care.  Take a sitz bath: Fill a bathtub with 4 to 6 inches of warm water. You may also use a sitz bath pan that fits over a toilet. Sit in the sitz bath for 20 minutes. Do this 2 to 3 times a day, or as directed. The warm water can help decrease pain, muscle spasms, or swelling.     Apply heat: Apply a warm, moist compress on your anus for 20 to 30 minutes every 2 hours for as many days as directed. Heat helps decrease pain and muscle spasms.    Eat high-fiber foods: This will help prevent constipation and soften your bowel movements. High-fiber foods include fruit, vegetables, whole-grain breads and cereals, and beans. A dietitian or healthcare provider can help you create a high-fiber meal plan.    Drink liquids as directed: You may need to drink more liquid than usual to help soften your bowel movements. Ask how much liquid to drink each day and which liquids are best for you.    Follow up with your healthcare provider as directed: Write down your questions so you remember to ask them during your visits.    © [employer] 1973, 2023    	  back to top            © [employer] 1973, 2023

## 2023-05-21 NOTE — ED PROVIDER NOTE - OBJECTIVE STATEMENT
42-year-old female with a history of IBS, who has chronic abdominal pain and rectal pain presents with worsening rectal pain.  Patient states she had hemorrhoid removal about 1 year ago but continues to have pain.  Patient states she had a TeleDoc visit and she sent pictures to tele doctor and was told to go to the ER to eval for perirectal abscess as pt stated she felt like something was protruding from her rectum.  She is not having any fevers but a couple nights ago she woke up drenched in sweat.  Patient is chronically constipated and sometimes has to self disimpact.  Has history of gallbladder removal.

## 2023-05-21 NOTE — ED ADULT TRIAGE NOTE - CHIEF COMPLAINT QUOTE
states" I am having pain in my abdomen feel like sharp pain my doctor send me here to get checked fo yoly rectal abscess. patient had tele doc visit yesterday. past medical history IBS. c/o rectal bleeding when defecating. states" I am having pain in my abdomen since yesterday , feel like sharp pain and my doctor send me here to get checked for yoly rectal abscess. patient had tele doc visit yesterday. past medical history IBS. c/o rectal bleeding when ever she is moving her bowels.

## 2023-05-21 NOTE — ED ADULT NURSE NOTE - CHIEF COMPLAINT QUOTE
states" I am having pain in my abdomen since yesterday , feel like sharp pain and my doctor send me here to get checked for yoly rectal abscess. patient had tele doc visit yesterday. past medical history IBS. c/o rectal bleeding when ever she is moving her bowels.

## 2023-05-21 NOTE — ED ADULT NURSE NOTE - OBJECTIVE STATEMENT
Patient A&o X4, received in intake , with complaints of rectal pain. Patient states, "I noticed a bubble by my rectum a few days ago". Patient reports that she had a telehealth appointment and PCP concerned she might have a perirectal abscess. Patient reports pain rating 10 out of 10, denies taking medication pta. Patient also complains of bright red blood in stool. Patient able to speak in clear sentences, respirations equal and unlabored. Lung sounds clear b/l, equal chest rise and fall noted. Denies CP/SOB, fever, chills, nausea, vomiting and diarrhea at this time. Skin warm and dry. Provider at bedside for eval, pending further orders.

## 2023-05-21 NOTE — ED ADULT NURSE NOTE - NSFALLUNIVINTERV_ED_ALL_ED
Bed/Stretcher in lowest position, wheels locked, appropriate side rails in place/Call bell, personal items and telephone in reach/Instruct patient to call for assistance before getting out of bed/chair/stretcher/Non-slip footwear applied when patient is off stretcher/[place] to call system/Physically safe environment - no spills, clutter or unnecessary equipment/Purposeful proactive rounding/Room/bathroom lighting operational, light cord in reach

## 2024-01-09 ENCOUNTER — NON-APPOINTMENT (OUTPATIENT)
Age: 44
End: 2024-01-09

## 2024-03-18 ENCOUNTER — OUTPATIENT (OUTPATIENT)
Dept: OUTPATIENT SERVICES | Facility: HOSPITAL | Age: 44
LOS: 1 days | End: 2024-03-18
Payer: COMMERCIAL

## 2024-03-18 ENCOUNTER — APPOINTMENT (OUTPATIENT)
Dept: RADIOLOGY | Facility: CLINIC | Age: 44
End: 2024-03-18
Payer: COMMERCIAL

## 2024-03-18 DIAGNOSIS — Z90.49 ACQUIRED ABSENCE OF OTHER SPECIFIED PARTS OF DIGESTIVE TRACT: Chronic | ICD-10-CM

## 2024-03-18 DIAGNOSIS — Z00.00 ENCOUNTER FOR GENERAL ADULT MEDICAL EXAMINATION WITHOUT ABNORMAL FINDINGS: ICD-10-CM

## 2024-03-18 DIAGNOSIS — Z98.890 OTHER SPECIFIED POSTPROCEDURAL STATES: Chronic | ICD-10-CM

## 2024-03-18 DIAGNOSIS — Z98.51 TUBAL LIGATION STATUS: Chronic | ICD-10-CM

## 2024-03-18 DIAGNOSIS — Z98.891 HISTORY OF UTERINE SCAR FROM PREVIOUS SURGERY: Chronic | ICD-10-CM

## 2024-03-18 PROCEDURE — 74018 RADEX ABDOMEN 1 VIEW: CPT | Mod: 26

## 2024-03-18 PROCEDURE — 74018 RADEX ABDOMEN 1 VIEW: CPT

## 2024-09-16 ENCOUNTER — APPOINTMENT (OUTPATIENT)
Dept: AFTER HOURS CARE | Facility: EMERGENCY ROOM | Age: 44
End: 2024-09-16
Payer: COMMERCIAL

## 2024-09-16 ENCOUNTER — EMERGENCY (EMERGENCY)
Facility: HOSPITAL | Age: 44
LOS: 0 days | Discharge: HOME | End: 2024-09-16
Attending: STUDENT IN AN ORGANIZED HEALTH CARE EDUCATION/TRAINING PROGRAM
Payer: COMMERCIAL

## 2024-09-16 VITALS
TEMPERATURE: 99 F | DIASTOLIC BLOOD PRESSURE: 93 MMHG | RESPIRATION RATE: 18 BRPM | HEART RATE: 76 BPM | SYSTOLIC BLOOD PRESSURE: 146 MMHG | OXYGEN SATURATION: 100 % | HEIGHT: 59 IN | WEIGHT: 119.93 LBS

## 2024-09-16 VITALS
SYSTOLIC BLOOD PRESSURE: 161 MMHG | OXYGEN SATURATION: 97 % | TEMPERATURE: 98 F | DIASTOLIC BLOOD PRESSURE: 87 MMHG | RESPIRATION RATE: 18 BRPM

## 2024-09-16 DIAGNOSIS — Z98.891 HISTORY OF UTERINE SCAR FROM PREVIOUS SURGERY: Chronic | ICD-10-CM

## 2024-09-16 DIAGNOSIS — Z90.49 ACQUIRED ABSENCE OF OTHER SPECIFIED PARTS OF DIGESTIVE TRACT: Chronic | ICD-10-CM

## 2024-09-16 DIAGNOSIS — R20.0 ANESTHESIA OF SKIN: ICD-10-CM

## 2024-09-16 DIAGNOSIS — R20.2 PARESTHESIA OF SKIN: ICD-10-CM

## 2024-09-16 DIAGNOSIS — R26.9 UNSPECIFIED ABNORMALITIES OF GAIT AND MOBILITY: ICD-10-CM

## 2024-09-16 DIAGNOSIS — Z98.51 TUBAL LIGATION STATUS: Chronic | ICD-10-CM

## 2024-09-16 DIAGNOSIS — Z98.890 OTHER SPECIFIED POSTPROCEDURAL STATES: Chronic | ICD-10-CM

## 2024-09-16 DIAGNOSIS — R33.9 RETENTION OF URINE, UNSPECIFIED: ICD-10-CM

## 2024-09-16 DIAGNOSIS — K59.00 CONSTIPATION, UNSPECIFIED: ICD-10-CM

## 2024-09-16 LAB
ANION GAP SERPL CALC-SCNC: 4 MMOL/L — LOW (ref 5–17)
APPEARANCE UR: CLEAR — SIGNIFICANT CHANGE UP
BILIRUB UR-MCNC: NEGATIVE — SIGNIFICANT CHANGE UP
BUN SERPL-MCNC: 11 MG/DL — SIGNIFICANT CHANGE UP (ref 7–23)
CALCIUM SERPL-MCNC: 9.7 MG/DL — SIGNIFICANT CHANGE UP (ref 8.5–10.1)
CHLORIDE SERPL-SCNC: 106 MMOL/L — SIGNIFICANT CHANGE UP (ref 96–108)
CO2 SERPL-SCNC: 28 MMOL/L — SIGNIFICANT CHANGE UP (ref 22–31)
COLOR SPEC: YELLOW — SIGNIFICANT CHANGE UP
CREAT SERPL-MCNC: 0.82 MG/DL — SIGNIFICANT CHANGE UP (ref 0.5–1.3)
CRP SERPL-MCNC: <3 MG/L — SIGNIFICANT CHANGE UP
DIFF PNL FLD: NEGATIVE — SIGNIFICANT CHANGE UP
EGFR: 89 ML/MIN/1.73M2 — SIGNIFICANT CHANGE UP
ERYTHROCYTE [SEDIMENTATION RATE] IN BLOOD: 2 MM/HR — SIGNIFICANT CHANGE UP (ref 0–15)
GLUCOSE SERPL-MCNC: 105 MG/DL — HIGH (ref 70–99)
GLUCOSE UR QL: NEGATIVE MG/DL — SIGNIFICANT CHANGE UP
HCG SERPL-ACNC: <1 MIU/ML — SIGNIFICANT CHANGE UP
HCT VFR BLD CALC: 41.5 % — SIGNIFICANT CHANGE UP (ref 34.5–45)
HGB BLD-MCNC: 14.3 G/DL — SIGNIFICANT CHANGE UP (ref 11.5–15.5)
KETONES UR-MCNC: NEGATIVE MG/DL — SIGNIFICANT CHANGE UP
LEUKOCYTE ESTERASE UR-ACNC: NEGATIVE — SIGNIFICANT CHANGE UP
MCHC RBC-ENTMCNC: 30.4 PG — SIGNIFICANT CHANGE UP (ref 27–34)
MCHC RBC-ENTMCNC: 34.5 G/DL — SIGNIFICANT CHANGE UP (ref 32–36)
MCV RBC AUTO: 88.1 FL — SIGNIFICANT CHANGE UP (ref 80–100)
NITRITE UR-MCNC: NEGATIVE — SIGNIFICANT CHANGE UP
NRBC # BLD: 0 /100 WBCS — SIGNIFICANT CHANGE UP (ref 0–0)
PH UR: 7 — SIGNIFICANT CHANGE UP (ref 5–8)
PLATELET # BLD AUTO: 225 K/UL — SIGNIFICANT CHANGE UP (ref 150–400)
POTASSIUM SERPL-MCNC: 3.8 MMOL/L — SIGNIFICANT CHANGE UP (ref 3.5–5.3)
POTASSIUM SERPL-SCNC: 3.8 MMOL/L — SIGNIFICANT CHANGE UP (ref 3.5–5.3)
PROT UR-MCNC: NEGATIVE MG/DL — SIGNIFICANT CHANGE UP
RBC # BLD: 4.71 M/UL — SIGNIFICANT CHANGE UP (ref 3.8–5.2)
RBC # FLD: 12.1 % — SIGNIFICANT CHANGE UP (ref 10.3–14.5)
SODIUM SERPL-SCNC: 138 MMOL/L — SIGNIFICANT CHANGE UP (ref 135–145)
SP GR SPEC: 1.01 — SIGNIFICANT CHANGE UP (ref 1–1.03)
UROBILINOGEN FLD QL: 0.2 MG/DL — SIGNIFICANT CHANGE UP (ref 0.2–1)
WBC # BLD: 6.4 K/UL — SIGNIFICANT CHANGE UP (ref 3.8–10.5)
WBC # FLD AUTO: 6.4 K/UL — SIGNIFICANT CHANGE UP (ref 3.8–10.5)

## 2024-09-16 PROCEDURE — 99214 OFFICE O/P EST MOD 30 MIN: CPT

## 2024-09-16 PROCEDURE — 72158 MRI LUMBAR SPINE W/O & W/DYE: CPT | Mod: 26,MC

## 2024-09-16 PROCEDURE — 99285 EMERGENCY DEPT VISIT HI MDM: CPT

## 2024-09-16 RX ORDER — KETOROLAC TROMETHAMINE 30 MG/ML
10 INJECTION, SOLUTION INTRAMUSCULAR ONCE
Refills: 0 | Status: DISCONTINUED | OUTPATIENT
Start: 2024-09-16 | End: 2024-09-16

## 2024-09-16 RX ORDER — METHOCARBAMOL 750 MG/1
2 TABLET, FILM COATED ORAL
Qty: 18 | Refills: 0
Start: 2024-09-16 | End: 2024-09-18

## 2024-09-16 RX ADMIN — KETOROLAC TROMETHAMINE 10 MILLIGRAM(S): 30 INJECTION, SOLUTION INTRAMUSCULAR at 13:40

## 2024-09-16 RX ADMIN — KETOROLAC TROMETHAMINE 10 MILLIGRAM(S): 30 INJECTION, SOLUTION INTRAMUSCULAR at 12:40

## 2024-09-16 NOTE — HISTORY OF PRESENT ILLNESS
[Home] : at home, [unfilled] , at the time of the visit. [Other Location: e.g. Home (Enter Location, City,State)___] : at [unfilled] [Verbal consent obtained from patient] : the patient, [unfilled] [FreeTextEntry8] : karyn and other rectal procedure for rectal numbness/pain/problems defecating? and her GI and GYN have been telling her her numbness will be gone after the surgery.  She is c/o b/l LE numbness, rectal and vaginal numbness and back pain. She said she is having trouble somewhat standing and i told her she needs to see a spinal specialist and at least a rectal exam possibly (although ALEXIS if thats possible given her surgery) to eval for cauda equina. Patient will be sent to ED, d/w triage nurse at [facility].

## 2024-09-16 NOTE — ED ADULT TRIAGE NOTE - CHIEF COMPLAINT QUOTE
pt sent to ED by tele md for MRI due to vaginal and rectal numbness x 6 months. pain/ numbness radiating to left side. pt also unable to stand for long periods of time and nausea.

## 2024-09-16 NOTE — ED PROVIDER NOTE - CLINICAL SUMMARY MEDICAL DECISION MAKING FREE TEXT BOX
This patient is a 44-year-old female presenting to the emergency department today for numbness in her pelvic region.  Nursing notes and vital signs were reviewed.  CBC shows no evidence of leukocytosis.  No evidence of acute anemia.  No evidence of thrombocytopenia.  This patient's BMP shows no significant electrolyte abnormalities.  Serum pregnancy is negative.  Urinalysis shows no evidence of urinary tract infection.  She had 5 mL of urine output for residual.    Patient had normal rectal tone on exam.  She does have some symptoms concerning for cauda equina syndrome.  Minimal suspicion for acute epidural abscess at this time.  Minimal suspicion for epidural hematoma.  Minimal suspicion for discitis.  As there is concern for cauda equina syndrome, an MRI was ordered for this patient.    At this time, this patient's MRI has not been completed.  For this reason, the patient will be signed out to the oncoming physician.  Please see the oncoming physician's addendum's, notes, and progress notes for any change in this patient's management or care. This patient is a 44-year-old female presenting to the emergency department today for numbness in her pelvic region.  Nursing notes and vital signs were reviewed.  CBC shows no evidence of leukocytosis.  No evidence of acute anemia.  No evidence of thrombocytopenia.  This patient's BMP shows no significant electrolyte abnormalities.  Serum pregnancy is negative.  Urinalysis shows no evidence of urinary tract infection.  She had 5 mL of urine output for residual.    Patient had normal rectal tone on exam.  She does have some symptoms concerning for cauda equina syndrome.  Minimal suspicion for acute epidural abscess at this time.  Minimal suspicion for epidural hematoma.  Minimal suspicion for discitis.  As there is concern for cauda equina syndrome, an MRI was ordered for this patient.    MRI resulted showing no evidence of cauda equina syndrome.  At this time, we believe that the patient is safe for discharged home with outpatient follow-up with her PCP.  She expresses understanding and is amenable to this plan.  She is well-appearing nontoxic at this time.

## 2024-09-16 NOTE — ED PROVIDER NOTE - NS ED MD DISPO DISCHARGE CCDA
Is This A New Presentation, Or A Follow-Up?: Rash
Patient/Caregiver provided printed discharge information.

## 2024-09-16 NOTE — ED ADULT NURSE NOTE - NSFALLRISKINTERV_ED_ALL_ED

## 2024-09-16 NOTE — ED PROVIDER NOTE - OBJECTIVE STATEMENT
This patient is a 44-year-old female presenting to the emergency department today for numbness in her rectum and pelvic area.  She states that she has had the symptoms for quite some time.  She states that she has had a sphincterotomy about the rectal procedure for this issue.  She has seen her OB/GYN and her PCP for this issue and they recommended this treatment.  She states that that since this surgery she has had improvement in her ability to defecate, however she still has numbness and tingling in her pelvic region.  She states that she has had some difficulty with ambulation.  She is also endorsing some urinary retention.  She states that she has no fevers or chills.  No recent spinal surgeries.  She has no other relevant past medical history and does not take any medications at home.  She states that she does not have a history of intravenous drug use.  She has no headaches, lightheadedness, or dizziness.  No chest pain or shortness of breath.  No other complaints at this time.

## 2024-09-16 NOTE — ED POST DISCHARGE NOTE - ADDITIONAL DOCUMENTATION
CABRERA Jay: Received call from patient, requesting tramadol prescription however upon review of chart, physician did not indicate that tramadol was going to be sent.  Discussed with patient that this is a controlled substance and that I can send Tylenol or ibuprofen, patient states that does not work.  Requesting message be sent out to  Or  Schedule be provided, patient aware that I cannot provide that information.  Patient has no known allergies, denies chance of pregnancy, discussed sending Robaxin for back pain which can help with muscle spasms and is not a controlled substance, patient extremely upset and frustrated but agrees to prescription, strict return precautions provided

## 2024-09-16 NOTE — ED ADULT NURSE NOTE - OBJECTIVE STATEMENT
44 year old female a/ox3 c/o numbness and tingling x 6 months, pt reports sent by pcp for mri scan, pt reports numbness and tingling in vaginal area radiating down to the b/l legs, pt states "it gets worse to walk", pt reports flare-ups around menstrual period and ovulation, pt reports lmp x 1 week ago, pt ambulatory with steady gait

## 2024-09-16 NOTE — PLAN
[FreeTextEntry1] :   I advised her to go to a tertiary hospital but she prefers [facility] and to go by car, refuses ambulance transport at this time.

## 2024-09-16 NOTE — PHYSICAL EXAM
[TextEntry] :  PLEASE SEE HPI FOR ADDITIONAL RELEVANT PHYSICAL EXAM FINDINGS GENERAL: elderly f no acute distress, nontoxic HEAD: normocephalic EYES: no scleral icterus NECK: trachea midline, Full ROM RESP: no respiratory distress ABD: nondistended MSK: no gross deformity no leg edema NEURO: alert  SKIN: no rash

## 2024-09-16 NOTE — ED PROVIDER NOTE - NS ED ROS FT
CONSTITUTIONAL: No weight loss, fever, chills, weakness or fatigue.    HEENT:    Eyes: No visual loss, blurred vision, double vision or yellow sclerae.  Ears, Nose, Throat: No hearing loss, sneezing, congestion, runny nose or sore throat.    CARDIOVASCULAR: No chest pain, chest pressure or chest discomfort. No palpitations or edema.    RESPIRATORY: No shortness of breath, cough or sputum.    GASTROINTESTINAL: No anorexia, nausea, vomiting or diarrhea. No abdominal pain or blood.    SKIN: No rash or itching.    GENITOURINARY: Patient denies any changes to bowel or bladder function.    NEUROLOGICAL: No headache, dizziness, syncope, paralysis, numbness or tingling in the extremities.  Positive for constipation.  Positive for urinary retention.  Positive for numbness and tingling in the pelvic region.  Positive for gait abnormalities.    MUSCULOSKELETAL: No muscle, back pain, joint pain or stiffness.    PSYCHIATRIC: No suicidal or homicidal ideation.

## 2024-09-16 NOTE — ED PROVIDER NOTE - PATIENT PORTAL LINK FT
You can access the FollowMyHealth Patient Portal offered by [facility] by registering at the following website: [email or web address]. By joining [facility] FollowMyHealth portal, you will also be able to view your health information using other applications (apps) compatible with our system.

## 2024-12-17 ENCOUNTER — NON-APPOINTMENT (OUTPATIENT)
Age: 44
End: 2024-12-17

## (undated) DEVICE — POLY TRAP ETRAP

## (undated) DEVICE — CLAMP BX HOT RAD JAW 3

## (undated) DEVICE — BIOPSY FORCEP RADIAL JAW 4 STANDARD WITH NEEDLE

## (undated) DEVICE — PACK IV START WITH CHG

## (undated) DEVICE — TUBING IV SET GRAVITY 3Y 100" MACRO

## (undated) DEVICE — SUCTION YANKAUER NO CONTROL VENT

## (undated) DEVICE — FOLEY HOLDER STATLOCK 2 WAY ADULT

## (undated) DEVICE — SYR LUER LOK 50CC

## (undated) DEVICE — CATH IV SAFE BC 20G X 1.16" (PINK)

## (undated) DEVICE — BRUSH COLONOSCOPY CYTOLOGY

## (undated) DEVICE — CATH IV SAFE BC 22G X 1" (BLUE)

## (undated) DEVICE — TUBING SUCTION CONN 6FT STERILE

## (undated) DEVICE — ELCTR GROUNDING PAD ADULT COVIDIEN

## (undated) DEVICE — SENSOR O2 FINGER ADULT

## (undated) DEVICE — TUBING SUCTION 20FT

## (undated) DEVICE — FORCEP RADIAL JAW 4 JUMBO 2.8MM 3.2MM 240CM ORANGE DISP

## (undated) DEVICE — SOL INJ NS 0.9% 500ML 2 PORT

## (undated) DEVICE — IRRIGATOR BIO SHIELD